# Patient Record
Sex: FEMALE | Race: WHITE | NOT HISPANIC OR LATINO | Employment: FULL TIME | URBAN - METROPOLITAN AREA
[De-identification: names, ages, dates, MRNs, and addresses within clinical notes are randomized per-mention and may not be internally consistent; named-entity substitution may affect disease eponyms.]

---

## 2021-04-18 ENCOUNTER — OFFICE VISIT (OUTPATIENT)
Dept: URGENT CARE | Facility: URGENT CARE | Age: 41
End: 2021-04-18
Payer: COMMERCIAL

## 2021-04-18 VITALS
DIASTOLIC BLOOD PRESSURE: 80 MMHG | OXYGEN SATURATION: 97 % | HEART RATE: 81 BPM | TEMPERATURE: 97 F | WEIGHT: 188 LBS | SYSTOLIC BLOOD PRESSURE: 130 MMHG

## 2021-04-18 DIAGNOSIS — S61.217A LACERATION OF LEFT LITTLE FINGER WITHOUT FOREIGN BODY WITHOUT DAMAGE TO NAIL, INITIAL ENCOUNTER: Primary | ICD-10-CM

## 2021-04-18 PROCEDURE — 90715 TDAP VACCINE 7 YRS/> IM: CPT | Performed by: PHYSICIAN ASSISTANT

## 2021-04-18 PROCEDURE — 90471 IMMUNIZATION ADMIN: CPT | Performed by: PHYSICIAN ASSISTANT

## 2021-04-18 PROCEDURE — 12001 RPR S/N/AX/GEN/TRNK 2.5CM/<: CPT | Performed by: PHYSICIAN ASSISTANT

## 2021-04-19 NOTE — PROGRESS NOTES
SUBJECTIVE:     Chief Complaint   Patient presents with     Urgent Care     Laceration     start today 1 hr ago sx left hand pinky at the bottom, bleeding burning, last Tetanus 3/23/07 tx soap, water, band aid and pressure      Prema Jacques is a 40 year old female who presents to the clinic with a laceration on the left pinky finger  sustained 1 hour(s) ago.  This is a non-work related and accidental injury.    Mechanism of injury: cutting a streak knife cut her finger.  .    Associated symptoms: Denies numbness, weakness, or loss of function  Last tetanus booster within 10 years: 2007 and will update today    EXAM:   The patient appears today in alert,no apparent distress distress  VITALS: /80   Pulse 81   Temp 97  F (36.1  C)   Wt 85.3 kg (188 lb)   SpO2 97%     Size of laceration: 2 centimeters  Characteristics of the laceration: bleeding- mild, clean and straight  Tendon function intact: yes  Sensation to light touch intact: yes  Pulses intact: yes  Picture included in patient's chart: no    Assessment:  Laceration of left little finger without foreign body without damage to nail, initial encounter    PLAN:  PROCEDURE NOTE::  Wound was locally injected with 2 cc's of Lidocaine 2% plain  Good anesthesia was obtained  Prepped and draped in the usual sterile fashion  Wound cleaned with HIBICLENS  Wound soaked  Laceration was closed using 5 5-0 nylon interrupted sutures  After care instructions:  Keep wound clean and dry for the next 24-48 hours  Sutures out in 1 week  Signs of infection discussed today  Apply anti-bacterial ointment for 2 days  May return to work as long as wound is kept clean and dry  Discussed the probability of scarring  [facility] updated

## 2021-04-25 ENCOUNTER — OFFICE VISIT (OUTPATIENT)
Dept: URGENT CARE | Facility: URGENT CARE | Age: 41
End: 2021-04-25
Payer: COMMERCIAL

## 2021-04-25 VITALS
SYSTOLIC BLOOD PRESSURE: 148 MMHG | TEMPERATURE: 97.8 F | OXYGEN SATURATION: 100 % | DIASTOLIC BLOOD PRESSURE: 100 MMHG | HEART RATE: 78 BPM

## 2021-04-25 DIAGNOSIS — Z48.02 VISIT FOR SUTURE REMOVAL: Primary | ICD-10-CM

## 2021-04-25 PROCEDURE — 99024 POSTOP FOLLOW-UP VISIT: CPT | Performed by: PHYSICIAN ASSISTANT

## 2021-04-25 RX ORDER — CETIRIZINE HYDROCHLORIDE 10 MG/1
10 TABLET ORAL
COMMUNITY

## 2021-04-25 RX ORDER — FLUTICASONE PROPIONATE 50 MCG
SPRAY, SUSPENSION (ML) NASAL
COMMUNITY
Start: 2020-09-29

## 2021-04-25 NOTE — NURSING NOTE
Removed 5 suture on pt left pinky. Applied steri strips and wrapped her pinky with a pressure coban. Pt tolerated well.     TONY Santana on 4/25/2021 at 5:14 PM

## 2021-04-25 NOTE — PROGRESS NOTES
Suture removal visit. 5 sutures removed today. Clean. No evidence of infection. Steri-strips removed by MA.

## 2021-06-05 ENCOUNTER — HEALTH MAINTENANCE LETTER (OUTPATIENT)
Age: 41
End: 2021-06-05

## 2021-09-25 ENCOUNTER — HEALTH MAINTENANCE LETTER (OUTPATIENT)
Age: 41
End: 2021-09-25

## 2022-06-08 ENCOUNTER — APPOINTMENT (OUTPATIENT)
Dept: ULTRASOUND IMAGING | Facility: CLINIC | Age: 42
End: 2022-06-08
Attending: EMERGENCY MEDICINE
Payer: COMMERCIAL

## 2022-06-08 ENCOUNTER — HOSPITAL ENCOUNTER (EMERGENCY)
Facility: CLINIC | Age: 42
Discharge: HOME | End: 2022-06-08
Attending: EMERGENCY MEDICINE | Admitting: EMERGENCY MEDICINE
Payer: COMMERCIAL

## 2022-06-08 VITALS
WEIGHT: 172 LBS | HEART RATE: 70 BPM | RESPIRATION RATE: 16 BRPM | SYSTOLIC BLOOD PRESSURE: 165 MMHG | TEMPERATURE: 97.5 F | HEIGHT: 64 IN | OXYGEN SATURATION: 97 % | BODY MASS INDEX: 29.37 KG/M2 | DIASTOLIC BLOOD PRESSURE: 101 MMHG

## 2022-06-08 DIAGNOSIS — N83.202 HEMORRHAGIC CYST OF LEFT OVARY: ICD-10-CM

## 2022-06-08 LAB
ALBUMIN UR-MCNC: NEGATIVE MG/DL
ANION GAP SERPL CALCULATED.3IONS-SCNC: 1 MMOL/L (ref 3–14)
APPEARANCE UR: CLEAR
BASOPHILS # BLD AUTO: 0.1 10E3/UL (ref 0–0.2)
BASOPHILS NFR BLD AUTO: 1 %
BILIRUB UR QL STRIP: NEGATIVE
BUN SERPL-MCNC: 9 MG/DL (ref 7–30)
CALCIUM SERPL-MCNC: 8.6 MG/DL (ref 8.5–10.1)
CHLORIDE BLD-SCNC: 103 MMOL/L (ref 94–109)
CO2 SERPL-SCNC: 27 MMOL/L (ref 20–32)
COLOR UR AUTO: NORMAL
CREAT SERPL-MCNC: 0.7 MG/DL (ref 0.52–1.04)
EOSINOPHIL # BLD AUTO: 0.2 10E3/UL (ref 0–0.7)
EOSINOPHIL NFR BLD AUTO: 2 %
ERYTHROCYTE [DISTWIDTH] IN BLOOD BY AUTOMATED COUNT: 13.4 % (ref 10–15)
GFR SERPL CREATININE-BSD FRML MDRD: >90 ML/MIN/1.73M2
GLUCOSE BLD-MCNC: 94 MG/DL (ref 70–99)
GLUCOSE UR STRIP-MCNC: NEGATIVE MG/DL
HCG SERPL QL: NEGATIVE
HCT VFR BLD AUTO: 39.4 % (ref 35–47)
HGB BLD-MCNC: 13.1 G/DL (ref 11.7–15.7)
HGB UR QL STRIP: NEGATIVE
IMM GRANULOCYTES # BLD: 0 10E3/UL
IMM GRANULOCYTES NFR BLD: 0 %
KETONES UR STRIP-MCNC: NEGATIVE MG/DL
LEUKOCYTE ESTERASE UR QL STRIP: NEGATIVE
LYMPHOCYTES # BLD AUTO: 2.9 10E3/UL (ref 0.8–5.3)
LYMPHOCYTES NFR BLD AUTO: 41 %
MCH RBC QN AUTO: 31.7 PG (ref 26.5–33)
MCHC RBC AUTO-ENTMCNC: 33.2 G/DL (ref 31.5–36.5)
MCV RBC AUTO: 95 FL (ref 78–100)
MONOCYTES # BLD AUTO: 0.5 10E3/UL (ref 0–1.3)
MONOCYTES NFR BLD AUTO: 6 %
NEUTROPHILS # BLD AUTO: 3.5 10E3/UL (ref 1.6–8.3)
NEUTROPHILS NFR BLD AUTO: 50 %
NITRATE UR QL: NEGATIVE
NRBC # BLD AUTO: 0 10E3/UL
NRBC BLD AUTO-RTO: 0 /100
PH UR STRIP: 5.5 [PH] (ref 5–7)
PLATELET # BLD AUTO: 222 10E3/UL (ref 150–450)
POTASSIUM BLD-SCNC: 3.3 MMOL/L (ref 3.4–5.3)
RBC # BLD AUTO: 4.13 10E6/UL (ref 3.8–5.2)
RBC URINE: <1 /HPF
SODIUM SERPL-SCNC: 131 MMOL/L (ref 133–144)
SP GR UR STRIP: 1 (ref 1–1.03)
SQUAMOUS EPITHELIAL: 1 /HPF
UROBILINOGEN UR STRIP-MCNC: NORMAL MG/DL
WBC # BLD AUTO: 7.2 10E3/UL (ref 4–11)
WBC URINE: <1 /HPF

## 2022-06-08 PROCEDURE — 258N000003 HC RX IP 258 OP 636: Performed by: EMERGENCY MEDICINE

## 2022-06-08 PROCEDURE — 99285 EMERGENCY DEPT VISIT HI MDM: CPT | Mod: 25

## 2022-06-08 PROCEDURE — 93976 VASCULAR STUDY: CPT | Mod: XS

## 2022-06-08 PROCEDURE — 250N000013 HC RX MED GY IP 250 OP 250 PS 637: Performed by: EMERGENCY MEDICINE

## 2022-06-08 PROCEDURE — 81001 URINALYSIS AUTO W/SCOPE: CPT | Performed by: EMERGENCY MEDICINE

## 2022-06-08 PROCEDURE — 80048 BASIC METABOLIC PNL TOTAL CA: CPT | Performed by: EMERGENCY MEDICINE

## 2022-06-08 PROCEDURE — 84703 CHORIONIC GONADOTROPIN ASSAY: CPT | Performed by: EMERGENCY MEDICINE

## 2022-06-08 PROCEDURE — 36415 COLL VENOUS BLD VENIPUNCTURE: CPT | Performed by: EMERGENCY MEDICINE

## 2022-06-08 PROCEDURE — 96374 THER/PROPH/DIAG INJ IV PUSH: CPT

## 2022-06-08 PROCEDURE — 96361 HYDRATE IV INFUSION ADD-ON: CPT

## 2022-06-08 PROCEDURE — 85025 COMPLETE CBC W/AUTO DIFF WBC: CPT | Performed by: EMERGENCY MEDICINE

## 2022-06-08 PROCEDURE — 250N000011 HC RX IP 250 OP 636: Performed by: EMERGENCY MEDICINE

## 2022-06-08 PROCEDURE — 96375 TX/PRO/DX INJ NEW DRUG ADDON: CPT

## 2022-06-08 RX ORDER — KETOROLAC TROMETHAMINE 15 MG/ML
15 INJECTION, SOLUTION INTRAMUSCULAR; INTRAVENOUS ONCE
Status: COMPLETED | OUTPATIENT
Start: 2022-06-08 | End: 2022-06-08

## 2022-06-08 RX ORDER — OXYCODONE HYDROCHLORIDE 5 MG/1
10 TABLET ORAL ONCE
Status: COMPLETED | OUTPATIENT
Start: 2022-06-08 | End: 2022-06-08

## 2022-06-08 RX ORDER — ONDANSETRON 4 MG/1
4 TABLET, ORALLY DISINTEGRATING ORAL EVERY 6 HOURS PRN
Qty: 10 TABLET | Refills: 0 | Status: SHIPPED | OUTPATIENT
Start: 2022-06-08 | End: 2022-06-11

## 2022-06-08 RX ORDER — ONDANSETRON 2 MG/ML
4 INJECTION INTRAMUSCULAR; INTRAVENOUS
Status: COMPLETED | OUTPATIENT
Start: 2022-06-08 | End: 2022-06-08

## 2022-06-08 RX ORDER — MORPHINE SULFATE 4 MG/ML
4 INJECTION, SOLUTION INTRAMUSCULAR; INTRAVENOUS
Status: DISCONTINUED | OUTPATIENT
Start: 2022-06-08 | End: 2022-06-08 | Stop reason: HOSPADM

## 2022-06-08 RX ORDER — OXYCODONE HYDROCHLORIDE 5 MG/1
5 TABLET ORAL EVERY 6 HOURS PRN
Qty: 12 TABLET | Refills: 0 | Status: SHIPPED | OUTPATIENT
Start: 2022-06-08

## 2022-06-08 RX ADMIN — SODIUM CHLORIDE 1000 ML: 9 INJECTION, SOLUTION INTRAVENOUS at 03:26

## 2022-06-08 RX ADMIN — MORPHINE SULFATE 4 MG: 4 INJECTION INTRAVENOUS at 03:27

## 2022-06-08 RX ADMIN — OXYCODONE HYDROCHLORIDE 10 MG: 5 TABLET ORAL at 05:10

## 2022-06-08 RX ADMIN — ONDANSETRON 4 MG: 2 INJECTION INTRAMUSCULAR; INTRAVENOUS at 03:27

## 2022-06-08 RX ADMIN — KETOROLAC TROMETHAMINE 15 MG: 15 INJECTION, SOLUTION INTRAMUSCULAR; INTRAVENOUS at 04:31

## 2022-06-08 ASSESSMENT — ENCOUNTER SYMPTOMS
VOMITING: 0
FEVER: 0
ABDOMINAL PAIN: 1
DIARRHEA: 1
NAUSEA: 1

## 2022-06-08 NOTE — ED PROVIDER NOTES
"  History     Chief Complaint:  Abdominal Pain     The history is provided by the patient.      Prema Jacques is a 41 year old female who presents with left sided abdominal pain. She woke up with the pain yesterday morning. When she sits down, the pain would \"shoot upwards\". Other symptoms mentioned include nausea and diarrhea. She has been taking Tylenol and ibuprofen for pain management with no relief. She also consumed some alcohol to mask the pain. Prior abdominal surgeries include appendectomy. She mentions that her menstrual period is coming up in the next 3 to 4 days. She denies fever or vomiting.     Review of Systems   Constitutional: Negative for fever.   Gastrointestinal: Positive for abdominal pain, diarrhea and nausea. Negative for vomiting.   All other systems reviewed and are negative.    Allergies:  Isometheptene-Apap-Dichloral  Sympathomimetics    Medications:  Zyrtec   Flonase     Past Medical History:     Migraine     Past Surgical History:    Appendectomy      Family History:    Father - DM     Social History:  Patient presents to the ED with her  via private vehicle   Hx of alcohol use and tobacco use (current smoker)       Physical Exam     Patient Vitals for the past 24 hrs:   BP Temp Temp src Pulse Resp SpO2 Height Weight   06/08/22 0500  165/101 -- -- 70 -- 97 % -- --   06/08/22 0445 144/108 -- -- 62 -- 98 % -- --   06/08/22 0430  150/107 -- -- -- -- 98 % -- --   06/08/22 0345  156/100 -- -- 67 -- 99 % -- --   06/08/22 0315  139/96 -- -- 65 -- 98 % -- --   06/08/22 0220  161/113 97.5  F (36.4  C) Temporal 67 16 100 % 1.626 m (5' 4\") 78 kg (172 lb)     Physical Exam  Nursing note and vitals reviewed.  Constitutional: Cooperative.   HENT:   Mouth/Throat: Mucous membranes are normal.   Cardiovascular: Normal rate, regular rhythm and normal heart sounds.  No murmur.  Pulmonary/Chest: Effort normal and breath sounds normal. No respiratory distress. No wheezes.    Abdominal: Soft. Normal " appearance and bowel sounds are normal. No distension. Lower left abdominal tenderness. There is no rigidity and no guarding.    Neurological: Alert. Oriented x4.  Skin: Skin is warm and dry.   Psychiatric: Normal mood and affect.     Emergency Department Course   Imaging:  US Pelvic Complete w Transvaginal & Abd/Pel Duplex Limited  Final Result  IMPRESSION:    1.  Anteverted normal uterus. No fibroids. Nabothian cysts at the cervix. Normal endometrial stripe.  2.  Right ovary is normal containing a dominant follicle. Normal Doppler vascularity.  3.  Left ovary contains a hemorrhagic cyst. Normal Doppler vascularity.  4.  No free fluid in the dependent pelvis.  Report per radiology    Laboratory:  Labs Ordered and Resulted from Time of ED Arrival to Time of ED Departure   BASIC METABOLIC PANEL - Abnormal       Result Value    Sodium 131 (*)     Potassium 3.3 (*)     Chloride 103      Carbon Dioxide (CO2) 27      Anion Gap 1 (*)     Urea Nitrogen 9      Creatinine 0.70      Calcium 8.6      Glucose 94      GFR Estimate >90     ROUTINE UA WITH MICROSCOPIC REFLEX TO CULTURE - Normal    Color Urine Straw      Appearance Urine Clear      Glucose Urine Negative      Bilirubin Urine Negative      Ketones Urine Negative      Specific Gravity Urine 1.004      Blood Urine Negative      pH Urine 5.5      Protein Albumin Urine Negative      Urobilinogen Urine Normal      Nitrite Urine Negative      Leukocyte Esterase Urine Negative      RBC Urine <1      WBC Urine <1      Squamous Epithelials Urine 1     HCG QUALITATIVE PREGNANCY - Normal    hCG Serum Qualitative Negative     CBC WITH PLATELETS AND DIFFERENTIAL    WBC Count 7.2      RBC Count 4.13      Hemoglobin 13.1      Hematocrit 39.4      MCV 95      MCH 31.7      MCHC 33.2      RDW 13.4      Platelet Count 222      % Neutrophils 50      % Lymphocytes 41      % Monocytes 6      % Eosinophils 2      % Basophils 1      % Immature Granulocytes 0      NRBCs per 100 WBC 0       Absolute Neutrophils 3.5      Absolute Lymphocytes 2.9      Absolute Monocytes 0.5      Absolute Eosinophils 0.2      Absolute Basophils 0.1      Absolute Immature Granulocytes 0.0      Absolute NRBCs 0.0        Reviewed:  I reviewed nursing notes, vitals and past medical history    Assessments:  0254 I obtained history and examined the patient as noted above.   0443 I rechecked the patient and explained findings. I discussed plan for discharge home.    Interventions:  NS 1 L IV   Morphine 4 mg IV   Zofran 4 mg IV   Toradol 15 mg IV  Oxycodone 10mg PO    Disposition:  The patient was discharged to home.     Impression & Plan     Medical Decision Making:  Prema Jacques is a 41 year old female who presents with pelvic pain.  They look overall well.  A broad differential diagnosis was considered including colitis, appendicitis, intestinal cramping, pyelonephritis, UTI, kidney stone, constipation, diverticulitis, volvulus, ileus, obstruction, pregnancy (ectopic or intrauterine), ovarian cyst (enlarged or ruptured), ovarian torsion, PID, etc as possibilities.  The workup in the ED shows likely hemorrhagic ovarian cyst as source of pain. No other etiology for the patients pain is found at this point and my suspicion of an intraabdominal catastrophe or other worrisome etiology is very low.  Patient is hemodynamically stable in ED. Plan is home with 4 week recheck by ultrasound of cyst to ensure resolution.  Return for fevers greater than 102, increasing pain, other new symptoms develop.  Abdominal pain handout given.  Questions were answered.     Diagnosis:    ICD-10-CM    1. Hemorrhagic cyst of left ovary  N83.202        Discharge Medications:  Discharge Medication List as of 6/8/2022  5:13 AM      START taking these medications    Details   ondansetron (ZOFRAN ODT) 4 MG ODT tab Take 1 tablet (4 mg) by mouth every 6 hours as needed for nausea, Disp-10 tablet, R-0, Local Print      oxyCODONE (ROXICODONE) 5 MG tablet Take  1 tablet (5 mg) by mouth every 6 hours as needed for severe pain, Disp-12 tablet, R-0, Local Print             Scribe Disclosure:  I, Zeeshan Hernandez, am serving as a scribe at 2:54 AM on 6/8/2022 to document services personally performed by Sylvain Ch MD based on my observations and the provider's statements to me.        Sylvain Ch MD  06/08/22 0643

## 2022-06-08 NOTE — ED TRIAGE NOTES
"\"it felt like my left ovary is going to explode\". Period supposed to start in the next 3-4 days. Hurts to sit, pain shoots up abd into left rib. Pt had alcohol tonight to mask pain - ibuprofen and tylenol not helping. Has had diarrhea x4 days.       "

## 2022-07-02 ENCOUNTER — HEALTH MAINTENANCE LETTER (OUTPATIENT)
Age: 42
End: 2022-07-02

## 2023-01-07 ENCOUNTER — HEALTH MAINTENANCE LETTER (OUTPATIENT)
Age: 43
End: 2023-01-07

## 2023-07-15 ENCOUNTER — HEALTH MAINTENANCE LETTER (OUTPATIENT)
Age: 43
End: 2023-07-15

## 2024-02-10 ENCOUNTER — HEALTH MAINTENANCE LETTER (OUTPATIENT)
Age: 44
End: 2024-02-10

## 2024-09-07 ENCOUNTER — HEALTH MAINTENANCE LETTER (OUTPATIENT)
Age: 44
End: 2024-09-07